# Patient Record
Sex: FEMALE | Race: BLACK OR AFRICAN AMERICAN | NOT HISPANIC OR LATINO | Employment: OTHER | URBAN - METROPOLITAN AREA
[De-identification: names, ages, dates, MRNs, and addresses within clinical notes are randomized per-mention and may not be internally consistent; named-entity substitution may affect disease eponyms.]

---

## 2017-04-13 ENCOUNTER — APPOINTMENT (OUTPATIENT)
Dept: RADIOLOGY | Facility: MEDICAL CENTER | Age: 52
End: 2017-04-13
Attending: EMERGENCY MEDICINE
Payer: MEDICARE

## 2017-04-13 ENCOUNTER — HOSPITAL ENCOUNTER (EMERGENCY)
Facility: MEDICAL CENTER | Age: 52
End: 2017-04-14
Attending: EMERGENCY MEDICINE
Payer: MEDICARE

## 2017-04-13 DIAGNOSIS — J45.41 MODERATE PERSISTENT ASTHMA WITH ACUTE EXACERBATION: ICD-10-CM

## 2017-04-13 PROCEDURE — 71020 DX-CHEST-2 VIEWS: CPT

## 2017-04-13 PROCEDURE — 99285 EMERGENCY DEPT VISIT HI MDM: CPT

## 2017-04-13 NOTE — ED AVS SNAPSHOT
WegoWise Access Code: Activation code not generated  Current WegoWise Status: Active    [employer]  A secure, online tool to manage your health information     [employer]’s WegoWise® is a secure, online tool that connects you to your personalized health information from the privacy of your home -- day or night - making it very easy for you to manage your healthcare. Once the activation process is completed, you can even access your medical information using the WegoWise nina, which is available for free in the Apple Nina store or Google Play store.     WegoWise provides the following levels of access (as shown below):   My Chart Features   [facility] Primary Care Doctor [facility]  Specialists [facility]  Urgent  Care Non-[facility]  Primary Care  Doctor   Email your healthcare team securely and privately 24/7 X X X X   Manage appointments: schedule your next appointment; view details of past/upcoming appointments X      Request prescription refills. X      View recent personal medical records, including lab and immunizations X X X X   View health record, including health history, allergies, medications X X X X   Read reports about your outpatient visits, procedures, consult and ER notes X X X X   See your discharge summary, which is a recap of your hospital and/or ER visit that includes your diagnosis, lab results, and care plan. X X       How to register for WegoWise:  1. Go to  [email or web address].  2. Click on the Sign Up Now box, which takes you to the New Member Sign Up page. You will need to provide the following information:  a. Enter your WegoWise Access Code exactly as it appears at the top of this page. (You will not need to use this code after you’ve completed the sign-up process. If you do not sign up before the expiration date, you must request a new code.)   b. Enter your date of birth.   c. Enter your home email address.   d. Click Submit, and follow the next screen’s instructions.  3. Create a WegoWise ID. This will  be your [employer] login ID and cannot be changed, so think of one that is secure and easy to remember.  4. Create a [employer] password. You can change your password at any time.  5. Enter your Password Reset Question and Answer. This can be used at a later time if you forget your password.   6. Enter your e-mail address. This allows you to receive e-mail notifications when new information is available in [employer].  7. Click Sign Up. You can now view your health information.    For assistance activating your [employer] account, call [phone]

## 2017-04-13 NOTE — ED AVS SNAPSHOT
Home Care Instructions                                                                                                                Luzma De Jesus   MRN: [id number]    Department:  [facility], Emergency Dept   Date of Visit:  4/13/2017            [facility], Emergency Dept    [address] [address]    Phone:  [phone]      You were seen by     Fabian De León M.D.      Your Diagnosis Was     Moderate persistent asthma with acute exacerbation     J45.41       These are the medications you received during your hospitalization from 04/13/2017 2125 to 04/14/2017 0327     Date/Time Order Dose Route Action    04/14/2017 0035 methylPREDNISolone sod succ (SOLU-MEDROL) 125 MG injection 125 mg 125 mg Intravenous Given    04/14/2017 0030 NS infusion 1,000 mL 1,000 mL Intravenous New Bag    04/14/2017 0042 ipratropium-albuterol (DUONEB) nebulizer solution 6 mL 6 mL Nebulization Given    04/14/2017 0035 magnesium sulfate IVPB premix 2 g 2 g Intravenous New Bag    04/14/2017 0126 albuterol (PROVENTIL) 2.5mg/0.5ml nebulizer solution 5 mg 5 mg Nebulization Given    04/14/2017 0247 albuterol (PROVENTIL) 2.5mg/0.5ml nebulizer solution 5 mg 5 mg Nebulization Given      Follow-up Information     1. Follow up with Anya Iniguez M.D.. Schedule an appointment as soon as possible for a visit in 3 days.    Specialty:  Internal Medicine    Contact information    [address] [place] [id number] [phone]        Medication Information     Review all of your home medications and newly ordered medications with your primary doctor and/or pharmacist as soon as possible. Follow medication instructions as directed by your doctor and/or pharmacist.     Please keep your complete medication list with you and share with your physician. Update the information when medications are discontinued, doses are changed, or new medications (including over-the-counter products) are  added; and carry medication information at all times in the event of emergency situations.               Medication List      START taking these medications        Instructions    Morning Afternoon Evening Bedtime    albuterol-ipratropium  MCG/ACT Aero   Commonly known as:  COMBIVENT        Inhale 2 Puffs by mouth every four hours as needed for Shortness of Breath.   Dose:  2 Puff                          ASK your doctor about these medications        Instructions    Morning Afternoon Evening Bedtime    fluticasone 220 MCG/ACT Aero   Commonly known as:  FLOVENT HFA        Inhale 1 Puff by mouth 2 times a day.   Dose:  1 Puff                        ipratropium 17 MCG/ACT Aers   Commonly known as:  ATROVENT        Inhale 2 Puffs by mouth every 6 hours.   Dose:  2 Puff                        levothyroxine 150 MCG Tabs   Commonly known as:  SYNTHROID        TAKE ONE TABLET BY MOUTH ONCE DAILY                        mometasone 220 MCG/INH inhaler   Commonly known as:  ASMANEX        Inhale 2 Puffs by mouth every day.   Dose:  2 Puff                        montelukast 10 MG Tabs   Commonly known as:  SINGULAIR        Take 10 mg by mouth every day.   Dose:  10 mg                        * predniSONE 5 MG Tabs   What changed:  Another medication with the same name was added. Make sure you understand how and when to take each.   Commonly known as:  DELTASONE   Ask about: Which instructions should I use?        Take 5 mg by mouth every day. Indications: Acute Asthmatic Bronchitis, Pt is Prednisone taper.   Dose:  5 mg                        * predniSONE 20 MG Tabs   What changed:  You were already taking a medication with the same name, and this prescription was added. Make sure you understand how and when to take each.   Commonly known as:  DELTASONE   Ask about: Which instructions should I use?        Take 3 Tabs by mouth every day for 5 days.   Dose:  60 mg                        PROAIR  (90 BASE) MCG/ACT Aers  inhalation aerosol   Generic drug:  albuterol        Doctor's comments:  Rx filled.  Patient needs appointment.  Thank you.   INHALE TWO PUFFS BY MOUTH EVERY 6 HOURS AS NEEDED FOR SHORTNESS OF BREATH                        * Notice:  This list has 2 medication(s) that are the same as other medications prescribed for you. Read the directions carefully, and ask your doctor or other care provider to review them with you.         Where to Get Your Medications      You can get these medications from any pharmacy     Bring a paper prescription for each of these medications    - albuterol-ipratropium  MCG/ACT Aero  - predniSONE 20 MG Tabs            Procedures and tests performed during your visit     BTYPE NATRIURETIC PEPTIDE    CARDIAC MONITORING    CBC WITH DIFFERENTIAL    COMP METABOLIC PANEL    Cardiac Monitoring    DX-CHEST-2 VIEWS    EKG (ER)    ESTIMATED GFR    IV Saline Lock    O2 Protocol    OLD EKG    Pulse Ox    SALINE LOCK    TROPONIN        Discharge Instructions       Return if you have difficulty breathing, blue lips, productive cough, or fever.   Asthma, Acute Bronchospasm  Acute bronchospasm caused by asthma is also referred to as an asthma attack. Bronchospasm means your air passages become narrowed. The narrowing is caused by inflammation and tightening of the muscles in the air tubes (bronchi) in your lungs. This can make it hard to breathe or cause you to wheeze and cough.  CAUSES  Possible triggers are:  · Animal dander from the skin, hair, or feathers of animals.  · Dust mites contained in house dust.  · Cockroaches.  · Pollen from trees or grass.  · Mold.  · Cigarette or tobacco smoke.  · Air pollutants such as dust, household , hair sprays, aerosol sprays, paint fumes, strong chemicals, or strong odors.  · Cold air or weather changes. Cold air may trigger inflammation. Winds increase molds and pollens in the air.  · Strong emotions such as crying or laughing  hard.  · Stress.  · Certain medicines such as aspirin or beta-blockers.  · Sulfites in foods and drinks, such as dried fruits and wine.  · Infections or inflammatory conditions, such as a flu, cold, or inflammation of the nasal membranes (rhinitis).  · Gastroesophageal reflux disease (GERD). GERD is a condition where stomach acid backs up into your esophagus.  · Exercise or strenuous activity.  SIGNS AND SYMPTOMS   · Wheezing.  · Excessive coughing, particularly at night.  · Chest tightness.  · Shortness of breath.  DIAGNOSIS   Your health care provider will ask you about your medical history and perform a physical exam. A chest X-ray or blood testing may be performed to look for other causes of your symptoms or other conditions that may have triggered your asthma attack.   TREATMENT   Treatment is aimed at reducing inflammation and opening up the airways in your lungs.  Most asthma attacks are treated with inhaled medicines. These include quick relief or rescue medicines (such as bronchodilators) and controller medicines (such as inhaled corticosteroids). These medicines are sometimes given through an inhaler or a nebulizer. Systemic steroid medicine taken by mouth or given through an IV tube also can be used to reduce the inflammation when an attack is moderate or severe. Antibiotic medicines are only used if a bacterial infection is present.   HOME CARE INSTRUCTIONS   · Rest.  · Drink plenty of liquids. This helps the mucus to remain thin and be easily coughed up. Only use caffeine in moderation and do not use alcohol until you have recovered from your illness.  · Do not smoke. Avoid being exposed to secondhand smoke.  · You play a critical role in keeping yourself in good health. Avoid exposure to things that cause you to wheeze or to have breathing problems.  · Keep your medicines up-to-date and available. Carefully follow your health care provider's treatment plan.  · Take your medicine exactly as  prescribed.  · When pollen or pollution is bad, keep windows closed and use an air conditioner or go to places with air conditioning.  · Asthma requires careful medical care. See your health care provider for a follow-up as advised. If you are more than 24 weeks pregnant and you were prescribed any new medicines, let your obstetrician know about the visit and how you are doing. Follow up with your health care provider as directed.  · After you have recovered from your asthma attack, make an appointment with your outpatient doctor to talk about ways to reduce the likelihood of future attacks. If you do not have a doctor who manages your asthma, make an appointment with a primary care doctor to discuss your asthma.  SEEK IMMEDIATE MEDICAL CARE IF:   · You are getting worse.  · You have trouble breathing. If severe, call your local emergency services (911 in the [place]  · You develop chest pain or discomfort.  · You are vomiting.  · You are not able to keep fluids down.  · You are coughing up yellow, green, brown, or bloody sputum.  · You have a fever and your symptoms suddenly get worse.  · You have trouble swallowing.  MAKE SURE YOU:   · Understand these instructions.  · Will watch your condition.  · Will get help right away if you are not doing well or get worse.     This information is not intended to replace advice given to you by your health care provider. Make sure you discuss any questions you have with your health care provider.     Document Released: 04/03/2008 Document Revised: 12/23/2014 Document Reviewed: 06/25/2014  Elsevier Interactive Patient Education ©2016 [employer]                Patient Information     Patient Information    Following emergency treatment: all patient requiring follow-up care must return either to a private physician or a clinic if your condition worsens before you are able to obtain further medical attention, please return to the emergency room.     Billing Information    At  [facility], we work to make the billing process streamlined for our patients.  Our Representatives are here to answer any questions you may have regarding your hospital bill.  If you have insurance coverage and have supplied your insurance information to us, we will submit a claim to your insurer on your behalf.  Should you have any questions regarding your bill, we can be reached online or by phone as follows:  Online: You are able pay your bills online or live chat with our representatives about any billing questions you may have. We are here to help Monday - Friday from 8:00am to 7:30pm and 9:00am - 12:00pm on Saturdays.  Please visit [email or web address]  for more information.   Phone:  [phone] or [phone]    Please note that your emergency physician, surgeon, pathologist, radiologist, anesthesiologist, and other specialists are not employed by [facility] and will therefore bill separately for their services.  Please contact them directly for any questions concerning their bills at the numbers below:     Emergency Physician Services:  [phone]  [facility]:  [phone]  [facility]:  [phone]  [facility]:  [phone]    1. Your final bill may vary from the amount quoted upon discharge if all procedures are not complete at that time, or if your doctor has additional procedures of which we are not aware. You will receive an additional bill if you return to the Emergency Department at [facility] for suture removal regardless of the facility of which the sutures were placed.     2. Please arrange for settlement of this account at the emergency registration.    3. All self-pay accounts are due in full at the time of treatment.  If you are unable to meet this obligation then payment is expected within 4-5 days.     4. If you have had radiology studies (CT, X-ray, Ultrasound, MRI), you have received a preliminary  result during your emergency department visit. Please contact the radiology department [phone] to receive a copy of your final result. Please discuss the Final result with your primary physician or with the follow up physician provided.     Crisis Hotline:  [place] Crisis Hotline:  8-201-VYNALHH or 1-401.443.5432  [place] Crisis Hotline:    1-988.943.2538 or 124-982-4101         ED Discharge Follow Up Questions    1. In order to provide you with very good care, we would like to follow up with a phone call in the next few days.  May we have your permission to contact you?     YES /  NO    2. What is the best phone number to call you? (       )_____-__________    3. What is the best time to call you?      Morning  /  Afternoon  /  Evening                   Patient Signature:  ____________________________________________________________    Date:  ____________________________________________________________

## 2017-04-13 NOTE — ED AVS SNAPSHOT
4/14/2017    Luzma De Jesus  [address] [place] [address]    Dear Luzma:    [facility] wants to ensure your discharge home is safe and you or your loved ones have had all of your questions answered regarding your care after you leave the hospital.    Below is a list of resources and contact information should you have any questions regarding your hospital stay, follow-up instructions, or active medical symptoms.    Questions or Concerns Regarding… Contact   Medical Questions Related to Your Discharge  (7 days a week, 8am-5pm) Contact a Nurse Care Coordinator   [phone]   Medical Questions Not Related to Your Discharge  (24 hours a day / 7 days a week)  Contact the Nurse Health Line   [phone]    Medications or Discharge Instructions Refer to your discharge packet   or contact your [facility] Primary Care Provider   [phone]   Follow-up Appointment(s) Schedule your appointment via [employer]   or contact Scheduling [phone]   Billing Review your statement via [employer]  or contact Billing [phone]   Medical Records Review your records via [employer]   or contact Medical Records [phone]     You may receive a telephone call within two days of discharge. This call is to make certain you understand your discharge instructions and have the opportunity to have any questions answered. You can also easily access your medical information, test results and upcoming appointments via the [employer] free online health management tool. You can learn more and sign up at [employer]. For assistance setting up your [employer] account, please call [phone].    Once again, we want to ensure your discharge home is safe and that you have a clear understanding of any next steps in your care. If you have any questions or concerns, please do not hesitate to contact us, we are here for you. Thank you for choosing [facility] for your healthcare needs.    Sincerely,    Your [facility] Healthcare Team

## 2017-04-14 VITALS
TEMPERATURE: 98 F | OXYGEN SATURATION: 90 % | BODY MASS INDEX: 32.97 KG/M2 | RESPIRATION RATE: 20 BRPM | HEART RATE: 112 BPM | DIASTOLIC BLOOD PRESSURE: 71 MMHG | WEIGHT: 186.07 LBS | SYSTOLIC BLOOD PRESSURE: 142 MMHG | HEIGHT: 63 IN

## 2017-04-14 LAB
ALBUMIN SERPL BCP-MCNC: 3.4 G/DL (ref 3.2–4.9)
ALBUMIN/GLOB SERPL: 1.5 G/DL
ALP SERPL-CCNC: 65 U/L (ref 30–99)
ALT SERPL-CCNC: 23 U/L (ref 2–50)
ANION GAP SERPL CALC-SCNC: 8 MMOL/L (ref 0–11.9)
AST SERPL-CCNC: 18 U/L (ref 12–45)
BASOPHILS # BLD AUTO: 0.7 % (ref 0–1.8)
BASOPHILS # BLD: 0.08 K/UL (ref 0–0.12)
BILIRUB SERPL-MCNC: 0.5 MG/DL (ref 0.1–1.5)
BNP SERPL-MCNC: 3 PG/ML (ref 0–100)
BUN SERPL-MCNC: 13 MG/DL (ref 8–22)
CALCIUM SERPL-MCNC: 8.9 MG/DL (ref 8.5–10.5)
CHLORIDE SERPL-SCNC: 109 MMOL/L (ref 96–112)
CO2 SERPL-SCNC: 24 MMOL/L (ref 20–33)
CREAT SERPL-MCNC: 0.64 MG/DL (ref 0.5–1.4)
EOSINOPHIL # BLD AUTO: 0.84 K/UL (ref 0–0.51)
EOSINOPHIL NFR BLD: 7.2 % (ref 0–6.9)
ERYTHROCYTE [DISTWIDTH] IN BLOOD BY AUTOMATED COUNT: 43.4 FL (ref 35.9–50)
GFR SERPL CREATININE-BSD FRML MDRD: >60 ML/MIN/1.73 M 2
GLOBULIN SER CALC-MCNC: 2.2 G/DL (ref 1.9–3.5)
GLUCOSE SERPL-MCNC: 106 MG/DL (ref 65–99)
HCT VFR BLD AUTO: 40 % (ref 37–47)
HGB BLD-MCNC: 13.7 G/DL (ref 12–16)
IMM GRANULOCYTES # BLD AUTO: 0.04 K/UL (ref 0–0.11)
IMM GRANULOCYTES NFR BLD AUTO: 0.3 % (ref 0–0.9)
LYMPHOCYTES # BLD AUTO: 2.7 K/UL (ref 1–4.8)
LYMPHOCYTES NFR BLD: 23.3 % (ref 22–41)
MCH RBC QN AUTO: 30.1 PG (ref 27–33)
MCHC RBC AUTO-ENTMCNC: 34.3 G/DL (ref 33.6–35)
MCV RBC AUTO: 87.9 FL (ref 81.4–97.8)
MONOCYTES # BLD AUTO: 0.82 K/UL (ref 0–0.85)
MONOCYTES NFR BLD AUTO: 7.1 % (ref 0–13.4)
NEUTROPHILS # BLD AUTO: 7.12 K/UL (ref 2–7.15)
NEUTROPHILS NFR BLD: 61.4 % (ref 44–72)
NRBC # BLD AUTO: 0 K/UL
NRBC BLD AUTO-RTO: 0 /100 WBC
PLATELET # BLD AUTO: 289 K/UL (ref 164–446)
PMV BLD AUTO: 10.3 FL (ref 9–12.9)
POTASSIUM SERPL-SCNC: 3.9 MMOL/L (ref 3.6–5.5)
PROT SERPL-MCNC: 5.6 G/DL (ref 6–8.2)
RBC # BLD AUTO: 4.55 M/UL (ref 4.2–5.4)
SODIUM SERPL-SCNC: 141 MMOL/L (ref 135–145)
TROPONIN I SERPL-MCNC: <0.01 NG/ML (ref 0–0.04)
WBC # BLD AUTO: 11.6 K/UL (ref 4.8–10.8)

## 2017-04-14 PROCEDURE — 700101 HCHG RX REV CODE 250: Performed by: EMERGENCY MEDICINE

## 2017-04-14 PROCEDURE — 83880 ASSAY OF NATRIURETIC PEPTIDE: CPT

## 2017-04-14 PROCEDURE — 700111 HCHG RX REV CODE 636 W/ 250 OVERRIDE (IP): Performed by: EMERGENCY MEDICINE

## 2017-04-14 PROCEDURE — 94760 N-INVAS EAR/PLS OXIMETRY 1: CPT

## 2017-04-14 PROCEDURE — 96365 THER/PROPH/DIAG IV INF INIT: CPT

## 2017-04-14 PROCEDURE — 94640 AIRWAY INHALATION TREATMENT: CPT

## 2017-04-14 PROCEDURE — 93005 ELECTROCARDIOGRAM TRACING: CPT | Performed by: EMERGENCY MEDICINE

## 2017-04-14 PROCEDURE — 96366 THER/PROPH/DIAG IV INF ADDON: CPT

## 2017-04-14 PROCEDURE — 85025 COMPLETE CBC W/AUTO DIFF WBC: CPT

## 2017-04-14 PROCEDURE — 36415 COLL VENOUS BLD VENIPUNCTURE: CPT

## 2017-04-14 PROCEDURE — 700105 HCHG RX REV CODE 258: Performed by: EMERGENCY MEDICINE

## 2017-04-14 PROCEDURE — 96375 TX/PRO/DX INJ NEW DRUG ADDON: CPT

## 2017-04-14 PROCEDURE — 80053 COMPREHEN METABOLIC PANEL: CPT

## 2017-04-14 PROCEDURE — 84484 ASSAY OF TROPONIN QUANT: CPT

## 2017-04-14 RX ORDER — MAGNESIUM SULFATE HEPTAHYDRATE 40 MG/ML
2 INJECTION, SOLUTION INTRAVENOUS ONCE
Status: COMPLETED | OUTPATIENT
Start: 2017-04-14 | End: 2017-04-14

## 2017-04-14 RX ORDER — MAGNESIUM SULFATE HEPTAHYDRATE 500 MG/ML
2 INJECTION, SOLUTION INTRAMUSCULAR; INTRAVENOUS ONCE
Status: DISCONTINUED | OUTPATIENT
Start: 2017-04-14 | End: 2017-04-14

## 2017-04-14 RX ORDER — PREDNISONE 20 MG/1
60 TABLET ORAL DAILY
Qty: 15 TAB | Refills: 0 | Status: SHIPPED | OUTPATIENT
Start: 2017-04-14 | End: 2017-04-19

## 2017-04-14 RX ORDER — METHYLPREDNISOLONE SODIUM SUCCINATE 125 MG/2ML
125 INJECTION, POWDER, LYOPHILIZED, FOR SOLUTION INTRAMUSCULAR; INTRAVENOUS ONCE
Status: COMPLETED | OUTPATIENT
Start: 2017-04-14 | End: 2017-04-14

## 2017-04-14 RX ORDER — SODIUM CHLORIDE 9 MG/ML
1000 INJECTION, SOLUTION INTRAVENOUS ONCE
Status: COMPLETED | OUTPATIENT
Start: 2017-04-14 | End: 2017-04-14

## 2017-04-14 RX ORDER — IPRATROPIUM BROMIDE AND ALBUTEROL SULFATE 2.5; .5 MG/3ML; MG/3ML
6 SOLUTION RESPIRATORY (INHALATION)
Status: COMPLETED | OUTPATIENT
Start: 2017-04-14 | End: 2017-04-14

## 2017-04-14 RX ADMIN — IPRATROPIUM BROMIDE AND ALBUTEROL SULFATE 6 ML: .5; 3 SOLUTION RESPIRATORY (INHALATION) at 00:42

## 2017-04-14 RX ADMIN — ALBUTEROL SULFATE 5 MG: 2.5 SOLUTION RESPIRATORY (INHALATION) at 02:47

## 2017-04-14 RX ADMIN — SODIUM CHLORIDE 1000 ML: 9 INJECTION, SOLUTION INTRAVENOUS at 00:30

## 2017-04-14 RX ADMIN — ALBUTEROL SULFATE 5 MG: 2.5 SOLUTION RESPIRATORY (INHALATION) at 01:26

## 2017-04-14 RX ADMIN — METHYLPREDNISOLONE SODIUM SUCCINATE 125 MG: 125 INJECTION, POWDER, FOR SOLUTION INTRAMUSCULAR; INTRAVENOUS at 00:35

## 2017-04-14 RX ADMIN — MAGNESIUM SULFATE IN WATER 2 G: 40 INJECTION, SOLUTION INTRAVENOUS at 00:35

## 2017-04-14 ASSESSMENT — ENCOUNTER SYMPTOMS
NAUSEA: 0
DIARRHEA: 0
SHORTNESS OF BREATH: 0
CHILLS: 0
FEVER: 0
WHEEZING: 1
VOMITING: 0
COUGH: 1
DIAPHORESIS: 0

## 2017-04-14 ASSESSMENT — LIFESTYLE VARIABLES
EVER_SMOKED: YES
DO YOU DRINK ALCOHOL: NO

## 2017-04-14 ASSESSMENT — COPD QUESTIONNAIRES
HAVE YOU SMOKED AT LEAST 100 CIGARETTES IN YOUR ENTIRE LIFE: YES
DO YOU EVER COUGH UP ANY MUCUS OR PHLEGM?: YES, EVERY DAY
COPD SCREENING SCORE: 6
DURING THE PAST 4 WEEKS HOW MUCH DID YOU FEEL SHORT OF BREATH: SOME OF THE TIME

## 2017-04-14 NOTE — ED PROVIDER NOTES
"ED Provider Note    Scribed for Fabian De León M.D. by Shashank Coleman. 4/14/2017, 12:14 AM.    Primary care provider: Anya Iniguez M.D.  Means of arrival: Walk-In  History obtained from: Patient  History limited by: None    CHIEF COMPLAINT  Chief Complaint   Patient presents with   • Asthma     Pt has used inhaler 5x today        HPI  Luzma De Jesus is a 51 y.o. female with a history of asthma who presents to the Emergency Department complaining of asthma issues onset 2 weeks ago. She saw her PCP on 3/30 and was prescribed steroids with no relief. She has been using her inhaler, with up to 5x today, with no relief. The patient has had associated wheezing, cough, left-sided \"cramping\" chest pain today that last a few minutes. She denies any fever, chills, nausea, vomiting, diarrhea, shortness of breath, diaphoresis. Patient denies a history of MI.    REVIEW OF SYSTEMS  Review of Systems   Constitutional: Negative for fever, chills and diaphoresis.   Respiratory: Positive for cough and wheezing. Negative for shortness of breath.    Cardiovascular: Positive for chest pain (Left-sided \"Cramping\").   Gastrointestinal: Negative for nausea, vomiting and diarrhea.   All other systems reviewed and are negative.  C.    PAST MEDICAL HISTORY   has a past medical history of Migraines; Psychiatric disorder; Hypothyroid; ASTHMA; and Degenerative arthritis of lumbar spine.    SURGICAL HISTORY   has past surgical history that includes appendectomy; other orthopedic surgery; removal of tonsils,<9 y/o; and gyn surgery (2002).    SOCIAL HISTORY  Social History   Substance Use Topics   • Smoking status: Former Smoker -- 0.50 packs/day for 32 years     Types: Cigarettes     Quit date: 06/11/2013   • Smokeless tobacco: Never Used   • Alcohol Use: No      History   Drug Use No     Comment: amphetamine pill/ sudafed       FAMILY HISTORY  Family History   Problem Relation Age of Onset   • Cancer Paternal Grandmother    • Other " "Mother    • Other Father        CURRENT MEDICATIONS  No current facility-administered medications on file prior to encounter.     Current Outpatient Prescriptions on File Prior to Encounter   Medication Sig Dispense Refill   • PROAIR  (90 BASE) MCG/ACT Aero Soln inhalation aerosol INHALE TWO PUFFS BY MOUTH EVERY 6 HOURS AS NEEDED FOR SHORTNESS OF BREATH 1 Inhaler 0   • fluticasone (FLOVENT HFA) 220 MCG/ACT Aerosol Inhale 1 Puff by mouth 2 times a day. 1 Inhaler 3   • mometasone (ASMANEX) 220 MCG/INH inhaler Inhale 2 Puffs by mouth every day. 1 Inhaler 2   • ipratropium (ATROVENT) 17 MCG/ACT Aero Soln Inhale 2 Puffs by mouth every 6 hours. 17 g 3   • levothyroxine (SYNTHROID) 150 MCG Tab TAKE ONE TABLET BY MOUTH ONCE DAILY 90 Tab 3   • montelukast (SINGULAIR) 10 MG Tab Take 10 mg by mouth every day.     • predniSONE (DELTASONE) 5 MG Tab Take 5 mg by mouth every day. Indications: Acute Asthmatic Bronchitis, Pt is Prednisone taper.       ALLERGIES  No Known Allergies    PHYSICAL EXAM  VITAL SIGNS: /73 mmHg  Pulse 96  Temp(Src) 36.7 °C (98 °F)  Resp 24  Ht 1.6 m (5' 2.99\")  Wt 84.4 kg (186 lb 1.1 oz)  BMI 32.97 kg/m2  SpO2 93%    Constitutional: Well developed, Well nourished, Mild distress.   HENT: Normocephalic, Atraumatic, Oropharynx moist.   Eyes: Conjunctiva normal, No discharge.   Neck: Supple, No stridor.  Cardiovascular: Normal heart rate, Normal rhythm, No murmurs, equal pulses.   Pulmonary: Bilateral wheezing with tachypnea, Moderate respiratory distress, No rales, No rhonchi. Speaking in 3-4 words sentences.  Chest: No chest wall tenderness or deformity.   Abdomen:Soft, No tenderness, No masses, no rebound, no guarding.   Back: No CVA tenderness.   Musculoskeletal: No major deformities noted, No tenderness. No edema in extremities, No calf tenderness.  Skin: Warm, Dry, No erythema, No rash.   Neurologic: Alert & oriented x 3, Normal motor function,  No focal deficits noted.   Psychiatric: " Affect normal, Judgment normal, Mood normal.     LABS  Results for orders placed or performed during the hospital encounter of 04/13/17   BTYPE NATRIURETIC PEPTIDE   Result Value Ref Range    B Natriuretic Peptide 3 0 - 100 pg/mL   CBC WITH DIFFERENTIAL   Result Value Ref Range    WBC 11.6 (H) 4.8 - 10.8 K/uL    RBC 4.55 4.20 - 5.40 M/uL    Hemoglobin 13.7 12.0 - 16.0 g/dL    Hematocrit 40.0 37.0 - 47.0 %    MCV 87.9 81.4 - 97.8 fL    MCH 30.1 27.0 - 33.0 pg    MCHC 34.3 33.6 - 35.0 g/dL    RDW 43.4 35.9 - 50.0 fL    Platelet Count 289 164 - 446 K/uL    MPV 10.3 9.0 - 12.9 fL    Neutrophils-Polys 61.40 44.00 - 72.00 %    Lymphocytes 23.30 22.00 - 41.00 %    Monocytes 7.10 0.00 - 13.40 %    Eosinophils 7.20 (H) 0.00 - 6.90 %    Basophils 0.70 0.00 - 1.80 %    Immature Granulocytes 0.30 0.00 - 0.90 %    Nucleated RBC 0.00 /100 WBC    Neutrophils (Absolute) 7.12 2.00 - 7.15 K/uL    Lymphs (Absolute) 2.70 1.00 - 4.80 K/uL    Monos (Absolute) 0.82 0.00 - 0.85 K/uL    Eos (Absolute) 0.84 (H) 0.00 - 0.51 K/uL    Baso (Absolute) 0.08 0.00 - 0.12 K/uL    Immature Granulocytes (abs) 0.04 0.00 - 0.11 K/uL    NRBC (Absolute) 0.00 K/uL   COMP METABOLIC PANEL   Result Value Ref Range    Sodium 141 135 - 145 mmol/L    Potassium 3.9 3.6 - 5.5 mmol/L    Chloride 109 96 - 112 mmol/L    Co2 24 20 - 33 mmol/L    Anion Gap 8.0 0.0 - 11.9    Glucose 106 (H) 65 - 99 mg/dL    Bun 13 8 - 22 mg/dL    Creatinine 0.64 0.50 - 1.40 mg/dL    Calcium 8.9 8.5 - 10.5 mg/dL    AST(SGOT) 18 12 - 45 U/L    ALT(SGPT) 23 2 - 50 U/L    Alkaline Phosphatase 65 30 - 99 U/L    Total Bilirubin 0.5 0.1 - 1.5 mg/dL    Albumin 3.4 3.2 - 4.9 g/dL    Total Protein 5.6 (L) 6.0 - 8.2 g/dL    Globulin 2.2 1.9 - 3.5 g/dL    A-G Ratio 1.5 g/dL   TROPONIN   Result Value Ref Range    Troponin I <0.01 0.00 - 0.04 ng/mL   ESTIMATED GFR   Result Value Ref Range    GFR If African American >60 >60 mL/min/1.73 m 2    GFR If Non African American >60 >60 mL/min/1.73 m 2   EKG  (ER)   Result Value Ref Range    Report       [facility] Emergency Dept.    Test Date:  2017  Pt Name:    NICOLE WILSON              Department: ER  MRN:        [id number]                      Room:       RD 10  Gender:     F                            Technician: [id number]  :        1965                   Requested By:ALISSA GARCIA  Order #:    [id number]                    Reading MD:    Measurements  Intervals                                Axis  Rate:       70                           P:          42  IN:         184                          QRS:        39  QRSD:       92                           T:          34  QT:         424  QTc:        458    Interpretive Statements  SINUS RHYTHM  No previous ECG available for comparison     All labs reviewed by me.    EKG  12 Lead EKG interpreted by me shows a normal sinus rhythm at a rate of 70. Axis normal. No ST elevations. No T wave inversions. Final impression: Normal EKG.    RADIOLOGY  DX-CHEST-2 VIEWS   Final Result      1.  There is peribronchial wall thickening which can be seen with asthma or bronchitis.   2.  There is no acute pneumonia or failure.        The radiologist's interpretation of all radiological studies have been reviewed by me.    COURSE & MEDICAL DECISION MAKING  Pertinent Labs & Imaging studies reviewed. (See chart for details)    12:14 AM - Patient seen and examined at bedside. Patient will be treated with 2g Magnesium Sulfate, 125mg Solu-Medrol, IV Fluids for hydration, 6mL Duoneb. Ordered DX Chest, BNP, CBC, CMP, Troponin to evaluate her symptoms. The differential diagnoses include but are not limited to: Asthma exacerbation, Myocardial Infarction, Congestive Heart Failure, Pneumonia     1:13 AM - Patient reevaluated at bedside and has continued moderate wheezing. Ordered 5mg Proventil.    1:50 AM - Patient reevaluated at bedside and reports feeling better than before but still has continued significant  wheezing. Ordered 5mg Proventil.    2:55 AM - Patient reevaluated at bedside and her wheezing has improved significantly.    3:21 AM - Patient reevaluated at bedside and has no wheezing. She reports feeling much better and I informed them of discharge. She understands and verbalizes agreement.    Medical Decision Making: Patient presents with moderately persistent asthma. But after several breathing treatments this is commonly completely. She is also given magnesium citrate to help with exposed muscle relaxation. Patient has not been hypoxic she now has good air movement and seems reasonable thing she cannot be discharged home. We will refill her prescription for Combivent    The patient will return for new or worsening symptoms and is stable at the time of discharge.    The patient is referred to a primary physician for blood pressure management, diabetic screening, and for all other preventative health concerns.    DISPOSITION:  Patient will be discharged home in stable condition.    FOLLOW UP:  Anya Iniguez M.D.  [address] [place] [id number]  [phone]    Schedule an appointment as soon as possible for a visit in 3 days        OUTPATIENT MEDICATIONS:  Discharge Medication List as of 4/14/2017  3:27 AM      START taking these medications    Details   !! predniSONE (DELTASONE) 20 MG Tab Take 3 Tabs by mouth every day for 5 days., Disp-15 Tab, R-0, Print Rx Paper      albuterol-ipratropium (COMBIVENT)  MCG/ACT Aerosol Inhale 2 Puffs by mouth every four hours as needed for Shortness of Breath., Disp-1 Inhaler, R-0, Print Rx Paper       !! - Potential duplicate medications found. Please discuss with provider.        FINAL IMPRESSION  1. Moderate persistent asthma with acute exacerbation          Shashank NOONAN (Semaj), am scribing for, and in the presence of, Fabian De León M.D.    Electronically signed by: Shashank Coleman (Semaj), 4/14/2017    Fabian NOONAN M.D.  personally performed the services described in this documentation, as scribed by Shashank Coleman in my presence, and it is both accurate and complete.    The note accurately reflects work and decisions made by me.  Fabian De León  4/14/2017  5:07 AM

## 2017-04-14 NOTE — ED NOTES
"Chief Complaint   Patient presents with   • Asthma     Pt has used inhaler 5x today      Pt states she has seen PCP for same on 3/30, has been taking prednisone and a z-pack since. Pt states cough and symptoms have been persistent despite treatment. Pt has audible upper respiratory crackles without auscultation, strong and productive cough in triage, phlegm is yellow. Pt not in severe respiratory distress, maintaining airway. Pt states she has hx of hypothyroidism, no other hx. Pt placed back in triage, told to alert staff to any changes or worsening in condition.    /73 mmHg  Pulse 96  Temp(Src) 36.7 °C (98 °F)  Resp 24  Ht 1.6 m (5' 2.99\")  Wt 84.4 kg (186 lb 1.1 oz)  BMI 32.97 kg/m2  SpO2 93%    "

## 2017-04-14 NOTE — DISCHARGE INSTRUCTIONS
Return if you have difficulty breathing, blue lips, productive cough, or fever.   Asthma, Acute Bronchospasm  Acute bronchospasm caused by asthma is also referred to as an asthma attack. Bronchospasm means your air passages become narrowed. The narrowing is caused by inflammation and tightening of the muscles in the air tubes (bronchi) in your lungs. This can make it hard to breathe or cause you to wheeze and cough.  CAUSES  Possible triggers are:  · Animal dander from the skin, hair, or feathers of animals.  · Dust mites contained in house dust.  · Cockroaches.  · Pollen from trees or grass.  · Mold.  · Cigarette or tobacco smoke.  · Air pollutants such as dust, household , hair sprays, aerosol sprays, paint fumes, strong chemicals, or strong odors.  · Cold air or weather changes. Cold air may trigger inflammation. Winds increase molds and pollens in the air.  · Strong emotions such as crying or laughing hard.  · Stress.  · Certain medicines such as aspirin or beta-blockers.  · Sulfites in foods and drinks, such as dried fruits and wine.  · Infections or inflammatory conditions, such as a flu, cold, or inflammation of the nasal membranes (rhinitis).  · Gastroesophageal reflux disease (GERD). GERD is a condition where stomach acid backs up into your esophagus.  · Exercise or strenuous activity.  SIGNS AND SYMPTOMS   · Wheezing.  · Excessive coughing, particularly at night.  · Chest tightness.  · Shortness of breath.  DIAGNOSIS   Your health care provider will ask you about your medical history and perform a physical exam. A chest X-ray or blood testing may be performed to look for other causes of your symptoms or other conditions that may have triggered your asthma attack.   TREATMENT   Treatment is aimed at reducing inflammation and opening up the airways in your lungs.  Most asthma attacks are treated with inhaled medicines. These include quick relief or rescue medicines (such as bronchodilators) and  controller medicines (such as inhaled corticosteroids). These medicines are sometimes given through an inhaler or a nebulizer. Systemic steroid medicine taken by mouth or given through an IV tube also can be used to reduce the inflammation when an attack is moderate or severe. Antibiotic medicines are only used if a bacterial infection is present.   HOME CARE INSTRUCTIONS   · Rest.  · Drink plenty of liquids. This helps the mucus to remain thin and be easily coughed up. Only use caffeine in moderation and do not use alcohol until you have recovered from your illness.  · Do not smoke. Avoid being exposed to secondhand smoke.  · You play a critical role in keeping yourself in good health. Avoid exposure to things that cause you to wheeze or to have breathing problems.  · Keep your medicines up-to-date and available. Carefully follow your health care provider's treatment plan.  · Take your medicine exactly as prescribed.  · When pollen or pollution is bad, keep windows closed and use an air conditioner or go to places with air conditioning.  · Asthma requires careful medical care. See your health care provider for a follow-up as advised. If you are more than 24 weeks pregnant and you were prescribed any new medicines, let your obstetrician know about the visit and how you are doing. Follow up with your health care provider as directed.  · After you have recovered from your asthma attack, make an appointment with your outpatient doctor to talk about ways to reduce the likelihood of future attacks. If you do not have a doctor who manages your asthma, make an appointment with a primary care doctor to discuss your asthma.  SEEK IMMEDIATE MEDICAL CARE IF:   · You are getting worse.  · You have trouble breathing. If severe, call your local emergency services (911 in the [place]  · You develop chest pain or discomfort.  · You are vomiting.  · You are not able to keep fluids down.  · You are coughing up yellow, green, brown, or  bloody sputum.  · You have a fever and your symptoms suddenly get worse.  · You have trouble swallowing.  MAKE SURE YOU:   · Understand these instructions.  · Will watch your condition.  · Will get help right away if you are not doing well or get worse.     This information is not intended to replace advice given to you by your health care provider. Make sure you discuss any questions you have with your health care provider.     Document Released: 04/03/2008 Document Revised: 12/23/2014 Document Reviewed: 06/25/2014  [employer] Interactive Patient Education ©2016 [employer]

## 2017-04-14 NOTE — ED NOTES
Discharge orders received, IV and monitor discontinued, instructions and education given, follow-up appointments discussed, prescription x2 given, pt verbalized understanding.

## 2017-04-14 NOTE — ED NOTES
Outpatient pharmacy called regarding below rx - Combivent no longer available.    albuterol-ipratropium (COMBIVENT)  MCG/ACT Aerosol [[id number]]  Order Details     Dose: 2 Puff Route: Inhalation Frequency: EVERY 4 HOURS PRN for Shortness of Breath   Dispense Quantity:  1 Inhaler Refills:  0 Fills Remainin          Sig: Inhale 2 Puffs by mouth every four hours as needed for Shortness of Breath.         Written Date:  17 Expiration Date:  --     Start Date:  17 End Date:  --     Ordering Provider:  Fabian De León M.D. Authorizing Provider:  Fabian De León M.D. Ordering User:  Fabian De León M.D.                      Pharmacy:  [place] [id number] [facility] [place] - [address] [facility]      Pharmacy Comments:  --         Quantity Remainin Inhaler Quantity Filled:  0 Inhaler            Changed rx to Combivent respimat, i puff inhalation q 4 hours prn SOB, dispense 1 inhaler, no refills    Leilani Fernandez, PharmD, CGP, BCPS

## 2017-04-18 LAB — EKG IMPRESSION: NORMAL

## 2021-03-15 DIAGNOSIS — Z23 NEED FOR VACCINATION: ICD-10-CM
